# Patient Record
Sex: FEMALE | Race: WHITE | NOT HISPANIC OR LATINO | URBAN - METROPOLITAN AREA
[De-identification: names, ages, dates, MRNs, and addresses within clinical notes are randomized per-mention and may not be internally consistent; named-entity substitution may affect disease eponyms.]

---

## 2017-10-04 ENCOUNTER — OUTPATIENT (OUTPATIENT)
Dept: OUTPATIENT SERVICES | Facility: HOSPITAL | Age: 62
LOS: 1 days | End: 2017-10-04

## 2017-10-04 VITALS
SYSTOLIC BLOOD PRESSURE: 120 MMHG | TEMPERATURE: 98 F | HEART RATE: 82 BPM | WEIGHT: 126.1 LBS | HEIGHT: 61 IN | RESPIRATION RATE: 14 BRPM | DIASTOLIC BLOOD PRESSURE: 82 MMHG

## 2017-10-04 DIAGNOSIS — Z98.890 OTHER SPECIFIED POSTPROCEDURAL STATES: Chronic | ICD-10-CM

## 2017-10-04 DIAGNOSIS — Z40.01 ENCOUNTER FOR PROPHYLACTIC REMOVAL OF BREAST: ICD-10-CM

## 2017-10-04 DIAGNOSIS — Z98.89 OTHER SPECIFIED POSTPROCEDURAL STATES: Chronic | ICD-10-CM

## 2017-10-04 DIAGNOSIS — N62 HYPERTROPHY OF BREAST: ICD-10-CM

## 2017-10-04 LAB
BUN SERPL-MCNC: 18 MG/DL — SIGNIFICANT CHANGE UP (ref 7–23)
CALCIUM SERPL-MCNC: 9.5 MG/DL — SIGNIFICANT CHANGE UP (ref 8.4–10.5)
CHLORIDE SERPL-SCNC: 100 MMOL/L — SIGNIFICANT CHANGE UP (ref 98–107)
CO2 SERPL-SCNC: 25 MMOL/L — SIGNIFICANT CHANGE UP (ref 22–31)
CREAT SERPL-MCNC: 0.85 MG/DL — SIGNIFICANT CHANGE UP (ref 0.5–1.3)
GLUCOSE SERPL-MCNC: 82 MG/DL — SIGNIFICANT CHANGE UP (ref 70–99)
HCT VFR BLD CALC: 41 % — SIGNIFICANT CHANGE UP (ref 34.5–45)
HGB BLD-MCNC: 13.6 G/DL — SIGNIFICANT CHANGE UP (ref 11.5–15.5)
MCHC RBC-ENTMCNC: 29.3 PG — SIGNIFICANT CHANGE UP (ref 27–34)
MCHC RBC-ENTMCNC: 33.2 % — SIGNIFICANT CHANGE UP (ref 32–36)
MCV RBC AUTO: 88.4 FL — SIGNIFICANT CHANGE UP (ref 80–100)
NRBC # FLD: 0 — SIGNIFICANT CHANGE UP
PLATELET # BLD AUTO: 271 K/UL — SIGNIFICANT CHANGE UP (ref 150–400)
PMV BLD: 11.1 FL — SIGNIFICANT CHANGE UP (ref 7–13)
POTASSIUM SERPL-MCNC: 3.9 MMOL/L — SIGNIFICANT CHANGE UP (ref 3.5–5.3)
POTASSIUM SERPL-SCNC: 3.9 MMOL/L — SIGNIFICANT CHANGE UP (ref 3.5–5.3)
RBC # BLD: 4.64 M/UL — SIGNIFICANT CHANGE UP (ref 3.8–5.2)
RBC # FLD: 12 % — SIGNIFICANT CHANGE UP (ref 10.3–14.5)
SODIUM SERPL-SCNC: 139 MMOL/L — SIGNIFICANT CHANGE UP (ref 135–145)
WBC # BLD: 8.21 K/UL — SIGNIFICANT CHANGE UP (ref 3.8–10.5)
WBC # FLD AUTO: 8.21 K/UL — SIGNIFICANT CHANGE UP (ref 3.8–10.5)

## 2017-10-04 NOTE — H&P PST ADULT - SKIN/BREAST COMMENTS
neck pain, shoulder pain, shoulder grooves from bra straps x several years, symptoms have been worsening.  Pt scheduled for Bilateral breast reduction 10/17/17. Pt c/o neck pain, shoulder pain, shoulder grooves from bra straps, x several years, symptoms have been worsening.  Pt scheduled for Bilateral breast reduction 10/17/17. c/o neck pain, shoulder pain, shoulder grooves from bra straps, x several years, symptoms have been worsening.  Pt scheduled for Bilateral breast reduction 10/17/17.

## 2017-10-04 NOTE — H&P PST ADULT - PMH
Anxiety    GERD (gastroesophageal reflux disease)    Patella fracture  Bilateral 2010  Urine finding  Pt with increased calcium in urine, (treated with  on hydrochlorthiazide)  Varicose veins

## 2017-10-04 NOTE — H&P PST ADULT - NEGATIVE BREAST SYMPTOMS
no breast lump R/no nipple discharge R/no breast lump L/no nipple discharge L/no breast tenderness L/no breast tenderness R

## 2017-10-04 NOTE — H&P PST ADULT - NSANTHOSAYNRD_GEN_A_CORE
No. KEVIN screening performed.  STOP BANG Legend: 0-2 = LOW Risk; 3-4 = INTERMEDIATE Risk; 5-8 = HIGH Risk

## 2017-10-04 NOTE — H&P PST ADULT - PSY GEN HX ROS MEA POS PC
takes  Cymbalta  with good symptom control/anxiety hx of panic attacks, takes  Cymbalta.  Pt reports she has not had panic attack since she has been taking Cymbalta/anxiety

## 2017-10-04 NOTE — H&P PST ADULT - HISTORY OF PRESENT ILLNESS
62 y/io female with hx of neck pain, shoulder pain, shoulder grooves from bra straps x several years, symptoms have been worsening.  Pt scheduled for Bilateral breast reduction 10/17/17.

## 2017-10-04 NOTE — H&P PST ADULT - PSH
delivery NOS  1984 1987   H/O arthroscopy of left knee  Torn Miniscus - Repair  left Knee Meniscus - 2014  H/O varicose veins  Vein stripping   History of blepharoplasty  Bilateral Lids -   Patella fracture  Multiple surgeries for bilateral patella fracture; most recently 2013.  S/P abdominoplasty    S/P endometrial ablation    Tendon disorder  Repair of right elbow tendon  delivery NOS  1984 1987   H/O arthroscopy of left knee  Torn Miniscus - Repair  left Knee Meniscus - 2014  H/O blepharoplasty    H/O hand surgery    H/O varicose veins  Vein stripping   History of blepharoplasty  Bilateral Lids - 2013  Patella fracture  Multiple surgeries for bilateral patella fracture; most recently 2013.  S/P abdominoplasty    S/P endometrial ablation    Tendon disorder  Repair of right elbow tendon

## 2017-10-04 NOTE — H&P PST ADULT - NEGATIVE NEUROLOGICAL SYMPTOMS
no transient paralysis/no tremors/no paresthesias/no difficulty walking/no syncope/no vertigo/no weakness/no headache

## 2017-10-16 RX ORDER — SODIUM CHLORIDE 9 MG/ML
1000 INJECTION, SOLUTION INTRAVENOUS
Qty: 0 | Refills: 0 | Status: DISCONTINUED | OUTPATIENT
Start: 2017-10-17 | End: 2017-11-01

## 2017-10-17 ENCOUNTER — RESULT REVIEW (OUTPATIENT)
Age: 62
End: 2017-10-17

## 2017-10-17 ENCOUNTER — OUTPATIENT (OUTPATIENT)
Dept: OUTPATIENT SERVICES | Facility: HOSPITAL | Age: 62
LOS: 1 days | Discharge: HOME | End: 2017-10-17
Payer: COMMERCIAL

## 2017-10-17 VITALS
RESPIRATION RATE: 16 BRPM | SYSTOLIC BLOOD PRESSURE: 99 MMHG | OXYGEN SATURATION: 96 % | HEART RATE: 84 BPM | DIASTOLIC BLOOD PRESSURE: 52 MMHG | TEMPERATURE: 98 F

## 2017-10-17 VITALS
WEIGHT: 126.1 LBS | SYSTOLIC BLOOD PRESSURE: 119 MMHG | DIASTOLIC BLOOD PRESSURE: 78 MMHG | TEMPERATURE: 98 F | OXYGEN SATURATION: 96 % | RESPIRATION RATE: 16 BRPM | HEART RATE: 75 BPM | HEIGHT: 61 IN

## 2017-10-17 DIAGNOSIS — Z98.89 OTHER SPECIFIED POSTPROCEDURAL STATES: Chronic | ICD-10-CM

## 2017-10-17 DIAGNOSIS — Z40.01 ENCOUNTER FOR PROPHYLACTIC REMOVAL OF BREAST: ICD-10-CM

## 2017-10-17 DIAGNOSIS — Z98.890 OTHER SPECIFIED POSTPROCEDURAL STATES: Chronic | ICD-10-CM

## 2017-10-17 PROCEDURE — 88305 TISSUE EXAM BY PATHOLOGIST: CPT | Mod: 26

## 2017-10-17 PROCEDURE — 19318 BREAST REDUCTION: CPT | Mod: 50

## 2017-10-17 RX ORDER — OXYCODONE HYDROCHLORIDE 5 MG/1
5 TABLET ORAL ONCE
Qty: 0 | Refills: 0 | Status: DISCONTINUED | OUTPATIENT
Start: 2017-10-17 | End: 2017-10-17

## 2017-10-17 RX ORDER — FENTANYL CITRATE 50 UG/ML
25 INJECTION INTRAVENOUS
Qty: 0 | Refills: 0 | Status: DISCONTINUED | OUTPATIENT
Start: 2017-10-17 | End: 2017-10-17

## 2017-10-17 RX ORDER — ALUMINUM ZIRCONIUM TRICHLOROHYDREX GLY 0.2 G/G
1 STICK TOPICAL
Qty: 0 | Refills: 0 | COMMUNITY

## 2017-10-17 RX ORDER — PREGABALIN 225 MG/1
1 CAPSULE ORAL
Qty: 0 | Refills: 0 | COMMUNITY

## 2017-10-17 RX ORDER — DULOXETINE HYDROCHLORIDE 30 MG/1
1 CAPSULE, DELAYED RELEASE ORAL
Qty: 0 | Refills: 0 | COMMUNITY

## 2017-10-17 RX ORDER — SODIUM CHLORIDE 9 MG/ML
1000 INJECTION, SOLUTION INTRAVENOUS
Qty: 0 | Refills: 0 | Status: DISCONTINUED | OUTPATIENT
Start: 2017-10-17 | End: 2017-11-01

## 2017-10-17 RX ORDER — PANTOPRAZOLE SODIUM 20 MG/1
1 TABLET, DELAYED RELEASE ORAL
Qty: 0 | Refills: 0 | COMMUNITY

## 2017-10-17 RX ORDER — ONDANSETRON 8 MG/1
4 TABLET, FILM COATED ORAL ONCE
Qty: 0 | Refills: 0 | Status: COMPLETED | OUTPATIENT
Start: 2017-10-17 | End: 2017-10-17

## 2017-10-17 RX ORDER — CHOLECALCIFEROL (VITAMIN D3) 125 MCG
0 CAPSULE ORAL
Qty: 0 | Refills: 0 | COMMUNITY

## 2017-10-17 RX ADMIN — OXYCODONE HYDROCHLORIDE 5 MILLIGRAM(S): 5 TABLET ORAL at 16:30

## 2017-10-17 RX ADMIN — OXYCODONE HYDROCHLORIDE 5 MILLIGRAM(S): 5 TABLET ORAL at 17:00

## 2017-10-17 RX ADMIN — ONDANSETRON 4 MILLIGRAM(S): 8 TABLET, FILM COATED ORAL at 16:30

## 2017-10-17 NOTE — ASU DISCHARGE PLAN (ADULT/PEDIATRIC). - NOTIFY
Increased Irritability or Sluggishness/Numbness, tingling/Bleeding that does not stop/Unable to Urinate/Inability to Tolerate Liquids or Foods/Swelling that continues/Pain not relieved by Medications/Persistent Nausea and Vomiting/Fever greater than 101/Numbness, color, or temperature change to extremity/Excessive Diarrhea

## 2017-10-17 NOTE — ASU PATIENT PROFILE, ADULT - PSH
delivery NOS  1984 1987   H/O arthroscopy of left knee  Torn Miniscus - Repair  left Knee Meniscus - 2014  H/O blepharoplasty    H/O hand surgery    H/O varicose veins  Vein stripping   History of blepharoplasty  Bilateral Lids - 2013  Patella fracture  Multiple surgeries for bilateral patella fracture; most recently 2013.  S/P abdominoplasty    S/P endometrial ablation    Tendon disorder  Repair of right elbow tendon

## 2017-10-17 NOTE — ASU DISCHARGE PLAN (ADULT/PEDIATRIC). - MEDICATION SUMMARY - MEDICATIONS TO TAKE
I will START or STAY ON the medications listed below when I get home from the hospital:    Norco 5 mg-325 mg oral tablet  -- 1 tab(s) by mouth every 6 hours, As Needed - for severe pain  -- Indication: For Pain    Cymbalta 60 mg oral delayed release capsule  -- 1 cap(s) by mouth once a day am  -- Indication: For Home medication    hydroCHLOROthiazide 12.5 mg oral capsule  -- 1 cap(s) by mouth once a day am  -- Indication: For Home medication    clindamycin 300 mg oral capsule  -- 1 cap(s) by mouth every 6 hours  -- Indication: For Anti-infective    Align 4 mg oral capsule  -- 1 cap(s) by mouth once a day am  -- Indication: For Home medication    Protonix 40 mg oral delayed release tablet  -- 1 tab(s) by mouth once a day am  -- Indication: For Home medication    Vitamin D3 1000 intl units oral capsule  -- 3000 iu oral daily am  -- Indication: For Home medication    Vitamin B12 1000 mcg oral tablet  -- 1 tab(s) by mouth once a day am  -- Indication: For Home medication

## 2017-10-17 NOTE — BRIEF OPERATIVE NOTE - PROCEDURE
<<-----Click on this checkbox to enter Procedure Breast reduction and mastopexy  10/17/2017    Active  [id number]

## 2017-10-17 NOTE — ASU DISCHARGE PLAN (ADULT/PEDIATRIC). - ITEMS TO FOLLOWUP WITH YOUR PHYSICIAN'S
Please follow up with Dr. Chong within x1 week after discharge from the hospital. You may call [phone] to schedule an appointment.

## 2017-10-17 NOTE — ASU DISCHARGE PLAN (ADULT/PEDIATRIC). - NURSING INSTRUCTIONS
Do not take pain medication on an empty stomach.  Increase fluids and fiber in diet to prevent constipation. do not take tyleno or acetominophen before 8:30 pm tonight

## 2017-10-17 NOTE — ASU DISCHARGE PLAN (ADULT/PEDIATRIC). - SPECIAL INSTRUCTIONS
Resume normal diet. Avoid straining, exercise, or heavy lifting.    Take medications as instructed by prescriptions.    Keep surgical bra on until cleared.    Keep your head elevated as much as possible throughout the day and night until cleared.     Keep dressings clean, dry, and intact. Do not remove them until you're seen by Dr. Chong.    Please sponge bathe only until your first follow-up appointment.    Do not remove steri strips. They will fall off on their own.  After showering, please pat steri strips dry. After surgery, some blood may escape from under the tape. It is of no consequence. The paper tape may fall off after several days. If not, I will remove it in my office.    Do not raise arms above head.    Follow-up with primary care doctor as well.     Call 911 and return to the ED for chest pain, shortness of breath, significant increase in pain, or significant change in color of surgical sites.

## 2017-10-23 LAB — SURGICAL PATHOLOGY STUDY: SIGNIFICANT CHANGE UP

## 2018-02-02 ENCOUNTER — APPOINTMENT (OUTPATIENT)
Dept: DERMATOLOGY | Facility: CLINIC | Age: 63
End: 2018-02-02
Payer: COMMERCIAL

## 2018-02-02 VITALS
SYSTOLIC BLOOD PRESSURE: 118 MMHG | DIASTOLIC BLOOD PRESSURE: 72 MMHG | WEIGHT: 122 LBS | HEIGHT: 61 IN | BODY MASS INDEX: 23.03 KG/M2

## 2018-02-02 DIAGNOSIS — D48.9 NEOPLASM OF UNCERTAIN BEHAVIOR, UNSPECIFIED: ICD-10-CM

## 2018-02-02 DIAGNOSIS — Z80.8 FAMILY HISTORY OF MALIGNANT NEOPLASM OF OTHER ORGANS OR SYSTEMS: ICD-10-CM

## 2018-02-02 DIAGNOSIS — Z87.39 PERSONAL HISTORY OF OTHER DISEASES OF THE MUSCULOSKELETAL SYSTEM AND CONNECTIVE TISSUE: ICD-10-CM

## 2018-02-02 DIAGNOSIS — L81.4 OTHER MELANIN HYPERPIGMENTATION: ICD-10-CM

## 2018-02-02 DIAGNOSIS — Z12.83 ENCOUNTER FOR SCREENING FOR MALIGNANT NEOPLASM OF SKIN: ICD-10-CM

## 2018-02-02 DIAGNOSIS — L91.8 OTHER HYPERTROPHIC DISORDERS OF THE SKIN: ICD-10-CM

## 2018-02-02 PROCEDURE — 99203 OFFICE O/P NEW LOW 30 MIN: CPT

## 2018-02-02 PROCEDURE — D0051: CPT

## 2018-02-02 RX ORDER — PROMETHAZINE HYDROCHLORIDE AND CODEINE PHOSPHATE 6.25; 1 MG/5ML; MG/5ML
6.25-1 SOLUTION ORAL
Qty: 120 | Refills: 0 | Status: ACTIVE | COMMUNITY
Start: 2017-12-20

## 2018-02-02 RX ORDER — VALACYCLOVIR 500 MG/1
500 TABLET, FILM COATED ORAL
Qty: 14 | Refills: 0 | Status: ACTIVE | COMMUNITY
Start: 2017-10-09

## 2018-02-02 RX ORDER — HYDROCODONE BITARTRATE AND ACETAMINOPHEN 5; 325 MG/1; MG/1
5-325 TABLET ORAL
Qty: 30 | Refills: 0 | Status: ACTIVE | COMMUNITY
Start: 2017-10-02

## 2018-02-02 RX ORDER — DULOXETINE HYDROCHLORIDE 60 MG/1
60 CAPSULE, DELAYED RELEASE PELLETS ORAL
Qty: 90 | Refills: 0 | Status: ACTIVE | COMMUNITY
Start: 2017-09-12

## 2018-02-02 RX ORDER — MUPIROCIN 20 MG/G
2 OINTMENT TOPICAL
Qty: 22 | Refills: 0 | Status: ACTIVE | COMMUNITY
Start: 2017-10-30

## 2018-02-02 RX ORDER — CLINDAMYCIN HYDROCHLORIDE 150 MG/1
150 CAPSULE ORAL
Qty: 40 | Refills: 0 | Status: ACTIVE | COMMUNITY
Start: 2017-10-02

## 2018-02-02 RX ORDER — SULFAMETHOXAZOLE AND TRIMETHOPRIM 800; 160 MG/1; MG/1
800-160 TABLET ORAL
Qty: 20 | Refills: 0 | Status: ACTIVE | COMMUNITY
Start: 2017-11-06

## 2018-02-02 RX ORDER — CEFADROXIL 500 MG/1
500 CAPSULE ORAL
Qty: 20 | Refills: 0 | Status: ACTIVE | COMMUNITY
Start: 2017-10-30

## 2018-07-25 PROBLEM — Z80.8 FAMILY HISTORY OF SKIN CANCER: Status: INACTIVE | Noted: 2018-02-02

## 2019-04-17 ENCOUNTER — APPOINTMENT (OUTPATIENT)
Dept: ORTHOPEDIC SURGERY | Facility: CLINIC | Age: 64
End: 2019-04-17

## 2019-05-15 ENCOUNTER — APPOINTMENT (OUTPATIENT)
Dept: ORTHOPEDIC SURGERY | Facility: CLINIC | Age: 64
End: 2019-05-15
Payer: COMMERCIAL

## 2019-05-15 PROCEDURE — 99203 OFFICE O/P NEW LOW 30 MIN: CPT

## 2019-06-21 ENCOUNTER — RX RENEWAL (OUTPATIENT)
Age: 64
End: 2019-06-21

## 2019-06-21 RX ORDER — CIPROFLOXACIN HYDROCHLORIDE 250 MG/1
250 TABLET, FILM COATED ORAL
Qty: 14 | Refills: 0 | Status: ACTIVE | COMMUNITY
Start: 2019-06-21 | End: 1900-01-01

## 2019-09-18 ENCOUNTER — RX RENEWAL (OUTPATIENT)
Age: 64
End: 2019-09-18

## 2019-09-18 DIAGNOSIS — J45.40 MODERATE PERSISTENT ASTHMA, UNCOMPLICATED: ICD-10-CM

## 2019-09-18 RX ORDER — FLUTICASONE FUROATE AND VILANTEROL TRIFENATATE 100; 25 UG/1; UG/1
100-25 POWDER RESPIRATORY (INHALATION) DAILY
Qty: 2 | Refills: 11 | Status: ACTIVE | COMMUNITY
Start: 2019-09-18 | End: 1900-01-01

## 2019-10-16 ENCOUNTER — RECORD ABSTRACTING (OUTPATIENT)
Age: 64
End: 2019-10-16

## 2019-10-16 DIAGNOSIS — Z86.79 PERSONAL HISTORY OF OTHER DISEASES OF THE CIRCULATORY SYSTEM: ICD-10-CM

## 2019-10-16 DIAGNOSIS — Z86.010 PERSONAL HISTORY OF COLONIC POLYPS: ICD-10-CM

## 2019-10-16 DIAGNOSIS — K64.8 OTHER HEMORRHOIDS: ICD-10-CM

## 2019-10-16 DIAGNOSIS — Z86.39 PERSONAL HISTORY OF OTHER ENDOCRINE, NUTRITIONAL AND METABOLIC DISEASE: ICD-10-CM

## 2019-10-16 DIAGNOSIS — Z87.19 PERSONAL HISTORY OF OTHER DISEASES OF THE DIGESTIVE SYSTEM: ICD-10-CM

## 2019-10-16 DIAGNOSIS — K29.80 DUODENITIS W/OUT BLEEDING: ICD-10-CM

## 2019-10-21 ENCOUNTER — RX RENEWAL (OUTPATIENT)
Age: 64
End: 2019-10-21

## 2019-10-31 ENCOUNTER — OTHER (OUTPATIENT)
Age: 64
End: 2019-10-31

## 2019-10-31 ENCOUNTER — APPOINTMENT (OUTPATIENT)
Dept: INTERNAL MEDICINE | Facility: CLINIC | Age: 64
End: 2019-10-31
Payer: COMMERCIAL

## 2019-10-31 ENCOUNTER — MED ADMIN CHARGE (OUTPATIENT)
Age: 64
End: 2019-10-31

## 2019-10-31 PROCEDURE — 90471 IMMUNIZATION ADMIN: CPT

## 2019-10-31 PROCEDURE — 90732 PPSV23 VACC 2 YRS+ SUBQ/IM: CPT

## 2019-10-31 PROCEDURE — 90686 IIV4 VACC NO PRSV 0.5 ML IM: CPT

## 2019-10-31 PROCEDURE — 90472 IMMUNIZATION ADMIN EACH ADD: CPT

## 2020-04-27 DIAGNOSIS — U07.1 COVID-19: ICD-10-CM

## 2020-04-27 NOTE — HISTORY OF PRESENT ILLNESS
[Verbal consent obtained from patient] : the patient, [unfilled] [FreeTextEntry1] : Patient is a 61 y/o F with hx of asthma, bronchitis, chronic GERD who recently tested positive for COVID-19 on 03/25/2020. Patient states she tested positive at an outpatient testing facility and never required hospitalization. States symptoms resolved at home with symptomatic management. Denies fevers, chills, SOB, cough, lethargy/fatigue at this time.

## 2020-04-27 NOTE — ADDENDUM
[FreeTextEntry1] : reviewed with resident via telephone due to covid19 agree with residents evaluation and plan [email or web address]

## 2020-04-27 NOTE — PLAN
[FreeTextEntry1] : Will forward to [address] for further followup \par \par \par Case discussed with Dr. Marie. \par \par Marta Lay\par PGY-1 \par [employer].

## 2020-09-23 ENCOUNTER — APPOINTMENT (OUTPATIENT)
Dept: OBGYN | Facility: CLINIC | Age: 65
End: 2020-09-23
Payer: COMMERCIAL

## 2020-09-23 PROCEDURE — 99386 PREV VISIT NEW AGE 40-64: CPT

## 2020-11-02 ENCOUNTER — NON-APPOINTMENT (OUTPATIENT)
Age: 65
End: 2020-11-02

## 2020-11-02 ENCOUNTER — APPOINTMENT (OUTPATIENT)
Dept: INTERNAL MEDICINE | Facility: CLINIC | Age: 65
End: 2020-11-02
Payer: COMMERCIAL

## 2020-11-02 VITALS
HEART RATE: 83 BPM | BODY MASS INDEX: 21.52 KG/M2 | TEMPERATURE: 98.3 F | OXYGEN SATURATION: 97 % | WEIGHT: 114 LBS | HEIGHT: 61 IN

## 2020-11-02 VITALS — DIASTOLIC BLOOD PRESSURE: 70 MMHG | SYSTOLIC BLOOD PRESSURE: 100 MMHG

## 2020-11-02 DIAGNOSIS — R60.9 EDEMA, UNSPECIFIED: ICD-10-CM

## 2020-11-02 DIAGNOSIS — Z23 ENCOUNTER FOR IMMUNIZATION: ICD-10-CM

## 2020-11-02 PROCEDURE — 90662 IIV NO PRSV INCREASED AG IM: CPT

## 2020-11-02 PROCEDURE — 99072 ADDL SUPL MATRL&STAF TM PHE: CPT

## 2020-11-02 PROCEDURE — 36415 COLL VENOUS BLD VENIPUNCTURE: CPT

## 2020-11-02 PROCEDURE — 82272 OCCULT BLD FECES 1-3 TESTS: CPT

## 2020-11-02 PROCEDURE — 93000 ELECTROCARDIOGRAM COMPLETE: CPT

## 2020-11-02 PROCEDURE — G0008: CPT

## 2020-11-02 PROCEDURE — 99396 PREV VISIT EST AGE 40-64: CPT | Mod: 25

## 2020-11-02 RX ORDER — ZOSTER VACCINE RECOMBINANT, ADJUVANTED 50 MCG/0.5
50 KIT INTRAMUSCULAR
Qty: 1 | Refills: 1 | Status: ACTIVE | COMMUNITY
Start: 2020-11-02 | End: 1900-01-01

## 2020-11-02 NOTE — REVIEW OF SYSTEMS
[Negative] : Heme/Lymph [FreeTextEntry2] : 10 lb wt loss w dieting [FreeTextEntry3] : itch L canal [FreeTextEntry7] : see hpi [de-identified] : gets cervical blocks for H/A

## 2020-11-02 NOTE — HISTORY OF PRESENT ILLNESS
[de-identified] : 65 F for cpe. Pt had Covid 19 this March w full recovery other than Change in olfactory sense. Has mild ashtma, Gerd and anxiety on cymbalta. At the present time is physically active w/o cp sympt. GI to have endoscopy and colonoscopy and has occas chest pain from esoph spasm. No gu opr gyne sympt. Given flu shot

## 2020-11-02 NOTE — ASSESSMENT
[FreeTextEntry1] : given flu. All screening tests done. To have colonoscopy and endoscopy to [facility]. EKG normal

## 2020-11-02 NOTE — PHYSICAL EXAM
[Normal] : normal gait, coordination grossly intact, no focal deficits [de-identified] : s/peast reductions fat nexcrosis R 6 o'clock [FreeTextEntry1] : refused [de-identified] : neg [de-identified] : neg [de-identified] : neg

## 2020-11-03 LAB
25(OH)D3 SERPL-MCNC: 57 NG/ML
ALBUMIN SERPL ELPH-MCNC: 4.4 G/DL
ALP BLD-CCNC: 51 U/L
ALT SERPL-CCNC: 67 U/L
ANION GAP SERPL CALC-SCNC: 11 MMOL/L
APPEARANCE: CLEAR
AST SERPL-CCNC: 37 U/L
BACTERIA: NEGATIVE
BASOPHILS # BLD AUTO: 0.05 K/UL
BASOPHILS NFR BLD AUTO: 0.9 %
BILIRUB SERPL-MCNC: 0.4 MG/DL
BILIRUBIN URINE: NEGATIVE
BLOOD URINE: NEGATIVE
BUN SERPL-MCNC: 18 MG/DL
CALCIUM SERPL-MCNC: 9.6 MG/DL
CHLORIDE SERPL-SCNC: 100 MMOL/L
CHOLEST SERPL-MCNC: 231 MG/DL
CO2 SERPL-SCNC: 26 MMOL/L
COLOR: NORMAL
CREAT SERPL-MCNC: 0.66 MG/DL
EOSINOPHIL # BLD AUTO: 0.26 K/UL
EOSINOPHIL NFR BLD AUTO: 4.6 %
GLUCOSE QUALITATIVE U: NEGATIVE
GLUCOSE SERPL-MCNC: 97 MG/DL
HCT VFR BLD CALC: 40.7 %
HDLC SERPL-MCNC: 105 MG/DL
HGB BLD-MCNC: 13.5 G/DL
HYALINE CASTS: 0 /LPF
IMM GRANULOCYTES NFR BLD AUTO: 0.4 %
KETONES URINE: NEGATIVE
LDLC SERPL CALC-MCNC: 115 MG/DL
LEUKOCYTE ESTERASE URINE: NEGATIVE
LYMPHOCYTES # BLD AUTO: 2.57 K/UL
LYMPHOCYTES NFR BLD AUTO: 45.4 %
MAGNESIUM SERPL-MCNC: 2 MG/DL
MAN DIFF?: NORMAL
MCHC RBC-ENTMCNC: 29.6 PG
MCHC RBC-ENTMCNC: 33.2 GM/DL
MCV RBC AUTO: 89.3 FL
MICROSCOPIC-UA: NORMAL
MONOCYTES # BLD AUTO: 0.44 K/UL
MONOCYTES NFR BLD AUTO: 7.8 %
NEUTROPHILS # BLD AUTO: 2.32 K/UL
NEUTROPHILS NFR BLD AUTO: 40.9 %
NITRITE URINE: NEGATIVE
NONHDLC SERPL-MCNC: 126 MG/DL
PH URINE: 7.5
PLATELET # BLD AUTO: 289 K/UL
POTASSIUM SERPL-SCNC: 4.9 MMOL/L
PROT SERPL-MCNC: 7 G/DL
PROTEIN URINE: NEGATIVE
RBC # BLD: 4.56 M/UL
RBC # FLD: 13.1 %
RED BLOOD CELLS URINE: 1 /HPF
SODIUM SERPL-SCNC: 137 MMOL/L
SPECIFIC GRAVITY URINE: 1.02
SQUAMOUS EPITHELIAL CELLS: 1 /HPF
TRIGL SERPL-MCNC: 56 MG/DL
UROBILINOGEN URINE: NORMAL
WBC # FLD AUTO: 5.66 K/UL
WHITE BLOOD CELLS URINE: 1 /HPF

## 2020-11-05 ENCOUNTER — APPOINTMENT (OUTPATIENT)
Dept: INTERNAL MEDICINE | Facility: CLINIC | Age: 65
End: 2020-11-05
Payer: COMMERCIAL

## 2020-11-05 PROCEDURE — 99072 ADDL SUPL MATRL&STAF TM PHE: CPT

## 2020-11-05 PROCEDURE — 90750 HZV VACC RECOMBINANT IM: CPT

## 2020-11-05 PROCEDURE — 90471 IMMUNIZATION ADMIN: CPT

## 2020-12-11 DIAGNOSIS — Z78.9 OTHER SPECIFIED HEALTH STATUS: ICD-10-CM

## 2020-12-11 DIAGNOSIS — Z87.891 PERSONAL HISTORY OF NICOTINE DEPENDENCE: ICD-10-CM

## 2020-12-11 DIAGNOSIS — Z87.81 PERSONAL HISTORY OF (HEALED) TRAUMATIC FRACTURE: ICD-10-CM

## 2021-01-28 ENCOUNTER — NON-APPOINTMENT (OUTPATIENT)
Age: 66
End: 2021-01-28

## 2021-01-28 RX ORDER — FLUTICASONE FUROATE AND VILANTEROL TRIFENATATE 100; 25 UG/1; UG/1
100-25 POWDER RESPIRATORY (INHALATION)
Qty: 1 | Refills: 0 | Status: ACTIVE | COMMUNITY
Start: 2019-06-21 | End: 1900-01-01

## 2021-03-10 ENCOUNTER — APPOINTMENT (OUTPATIENT)
Dept: RADIOLOGY | Facility: IMAGING CENTER | Age: 66
End: 2021-03-10
Payer: COMMERCIAL

## 2021-03-10 ENCOUNTER — OUTPATIENT (OUTPATIENT)
Dept: OUTPATIENT SERVICES | Facility: HOSPITAL | Age: 66
LOS: 1 days | End: 2021-03-10
Payer: COMMERCIAL

## 2021-03-10 ENCOUNTER — APPOINTMENT (OUTPATIENT)
Dept: INTERNAL MEDICINE | Facility: CLINIC | Age: 66
End: 2021-03-10
Payer: COMMERCIAL

## 2021-03-10 VITALS — WEIGHT: 118 LBS | BODY MASS INDEX: 22.28 KG/M2 | HEIGHT: 61 IN | OXYGEN SATURATION: 96 % | HEART RATE: 97 BPM

## 2021-03-10 DIAGNOSIS — J40 BRONCHITIS, NOT SPECIFIED AS ACUTE OR CHRONIC: ICD-10-CM

## 2021-03-10 DIAGNOSIS — J45.909 UNSPECIFIED ASTHMA, UNCOMPLICATED: ICD-10-CM

## 2021-03-10 DIAGNOSIS — Z98.890 OTHER SPECIFIED POSTPROCEDURAL STATES: Chronic | ICD-10-CM

## 2021-03-10 DIAGNOSIS — R94.5 ABNORMAL RESULTS OF LIVER FUNCTION STUDIES: ICD-10-CM

## 2021-03-10 DIAGNOSIS — Z98.89 OTHER SPECIFIED POSTPROCEDURAL STATES: Chronic | ICD-10-CM

## 2021-03-10 DIAGNOSIS — K21.9 GASTRO-ESOPHAGEAL REFLUX DISEASE WITHOUT ESOPHAGITIS: ICD-10-CM

## 2021-03-10 PROCEDURE — 71046 X-RAY EXAM CHEST 2 VIEWS: CPT | Mod: 26

## 2021-03-10 PROCEDURE — 99214 OFFICE O/P EST MOD 30 MIN: CPT | Mod: 25

## 2021-03-10 PROCEDURE — 36415 COLL VENOUS BLD VENIPUNCTURE: CPT

## 2021-03-10 PROCEDURE — 99072 ADDL SUPL MATRL&STAF TM PHE: CPT

## 2021-03-10 PROCEDURE — 71046 X-RAY EXAM CHEST 2 VIEWS: CPT

## 2021-03-10 NOTE — ASSESSMENT
[FreeTextEntry1] : Patient had an ALT of 67 which was isolated and we are repeating her liver functions.  Her exam is negative.  She does drink alcohol.  She has a persistent cough which is gotten worse and she is bringing up white to yellow sputum and this is probably a combination of nasal drip and asthma.  In view of the fact that she had Covid 19 as well as a past smoking history of 20 pack years we are getting a chest x-ray.  At the present time we have doubled her Breo to 200/25 with a mouthwash afterwards once in the morning and increased her Ventolin every 4 to 6 hours and added Flonase spray.  Following the evaluation of her chest x-ray if she is not better we will make further recommendations

## 2021-03-10 NOTE — HISTORY OF PRESENT ILLNESS
[FreeTextEntry8] : Patient is a 65-year-old female has had a long standing cough on and off felt to be secondary to nasal drip, reflux for which she is on pantoprazole and mild asthma for which she is on long-acting Breo 100/25 and Ventolin which she is taking 2 times a day at the present time.  She had Covid in March of last year with loss of sense of taste or smell body aches and pains and low-grade fever for 1 day but without pulmonary symptoms.  She was a 1 pack a day smoker until approximately 30 years ago when she stopped.  At that time she had a total of approximately 20 pack years.  Approximately 1 month ago she noted cough which did not respond to Robitussin and increased Ventolin at the present time is productive and white to yellow but without blood and she denies shortness of breath fever hemoptysis but does note wheezing.  She has no other significant symptoms at this time and feels well

## 2021-03-10 NOTE — PHYSICAL EXAM
[No Acute Distress] : no acute distress [Well Nourished] : well nourished [Well Developed] : well developed [Well-Appearing] : well-appearing [Normal Sclera/Conjunctiva] : normal sclera/conjunctiva [PERRL] : pupils equal round and reactive to light [EOMI] : extraocular movements intact [Normal Outer Ear/Nose] : the outer ears and nose were normal in appearance [Normal Oropharynx] : the oropharynx was normal [No JVD] : no jugular venous distention [No Lymphadenopathy] : no lymphadenopathy [Supple] : supple [Thyroid Normal, No Nodules] : the thyroid was normal and there were no nodules present [No Respiratory Distress] : no respiratory distress  [No Accessory Muscle Use] : no accessory muscle use [Clear to Auscultation] : lungs were clear to auscultation bilaterally [Normal Rate] : normal rate  [Regular Rhythm] : with a regular rhythm [Normal S1, S2] : normal S1 and S2 [No Murmur] : no murmur heard [No Carotid Bruits] : no carotid bruits [No Abdominal Bruit] : a ~M bruit was not heard ~T in the abdomen [No Varicosities] : no varicosities [Pedal Pulses Present] : the pedal pulses are present [No Edema] : there was no peripheral edema [No Palpable Aorta] : no palpable aorta [No Extremity Clubbing/Cyanosis] : no extremity clubbing/cyanosis [Soft] : abdomen soft [Non Tender] : non-tender [Non-distended] : non-distended [No Masses] : no abdominal mass palpated [No HSM] : no HSM [Normal Bowel Sounds] : normal bowel sounds [Normal Posterior Cervical Nodes] : no posterior cervical lymphadenopathy [Normal Anterior Cervical Nodes] : no anterior cervical lymphadenopathy [No CVA Tenderness] : no CVA  tenderness [No Spinal Tenderness] : no spinal tenderness [No Joint Swelling] : no joint swelling [Grossly Normal Strength/Tone] : grossly normal strength/tone [No Rash] : no rash [Coordination Grossly Intact] : coordination grossly intact [No Focal Deficits] : no focal deficits [Normal Gait] : normal gait [Deep Tendon Reflexes (DTR)] : deep tendon reflexes were 2+ and symmetric [Normal Affect] : the affect was normal [Normal Insight/Judgement] : insight and judgment were intact [de-identified] : And inspiratory and expiratory rhonchi

## 2021-03-11 ENCOUNTER — TRANSCRIPTION ENCOUNTER (OUTPATIENT)
Age: 66
End: 2021-03-11

## 2021-03-11 LAB
ALBUMIN SERPL ELPH-MCNC: 4.5 G/DL
ALP BLD-CCNC: 49 U/L
ALT SERPL-CCNC: 27 U/L
ANION GAP SERPL CALC-SCNC: 9 MMOL/L
AST SERPL-CCNC: 23 U/L
BASOPHILS # BLD AUTO: 0.06 K/UL
BASOPHILS NFR BLD AUTO: 0.8 %
BILIRUB SERPL-MCNC: 0.6 MG/DL
BUN SERPL-MCNC: 15 MG/DL
CALCIUM SERPL-MCNC: 10 MG/DL
CHLORIDE SERPL-SCNC: 102 MMOL/L
CO2 SERPL-SCNC: 28 MMOL/L
CREAT SERPL-MCNC: 0.81 MG/DL
EOSINOPHIL # BLD AUTO: 0.37 K/UL
EOSINOPHIL NFR BLD AUTO: 4.8 %
GLUCOSE SERPL-MCNC: 98 MG/DL
HCT VFR BLD CALC: 44 %
HGB BLD-MCNC: 14.5 G/DL
IMM GRANULOCYTES NFR BLD AUTO: 0.1 %
LYMPHOCYTES # BLD AUTO: 3.64 K/UL
LYMPHOCYTES NFR BLD AUTO: 47.6 %
MAN DIFF?: NORMAL
MCHC RBC-ENTMCNC: 29.4 PG
MCHC RBC-ENTMCNC: 33 GM/DL
MCV RBC AUTO: 89.1 FL
MONOCYTES # BLD AUTO: 0.49 K/UL
MONOCYTES NFR BLD AUTO: 6.4 %
NEUTROPHILS # BLD AUTO: 3.08 K/UL
NEUTROPHILS NFR BLD AUTO: 40.3 %
PLATELET # BLD AUTO: 324 K/UL
POTASSIUM SERPL-SCNC: 3.9 MMOL/L
PROT SERPL-MCNC: 7.6 G/DL
RBC # BLD: 4.94 M/UL
RBC # FLD: 12.7 %
SODIUM SERPL-SCNC: 138 MMOL/L
WBC # FLD AUTO: 7.65 K/UL

## 2021-03-15 RX ORDER — AZITHROMYCIN 250 MG/1
250 TABLET, FILM COATED ORAL
Qty: 6 | Refills: 1 | Status: ACTIVE | COMMUNITY
Start: 2017-12-13 | End: 1900-01-01

## 2021-03-18 LAB — IGE AB SERPL QL: 56 NG/ML

## 2021-04-05 ENCOUNTER — RX RENEWAL (OUTPATIENT)
Age: 66
End: 2021-04-05

## 2021-04-05 RX ORDER — ALPRAZOLAM 0.5 MG/1
0.5 TABLET ORAL
Qty: 60 | Refills: 0 | Status: ACTIVE | COMMUNITY
Start: 2017-09-12

## 2021-04-05 RX ORDER — PANTOPRAZOLE 40 MG/1
40 TABLET, DELAYED RELEASE ORAL
Qty: 90 | Refills: 0 | Status: ACTIVE | COMMUNITY
Start: 2021-04-05 | End: 1900-01-01

## 2021-04-08 ENCOUNTER — APPOINTMENT (OUTPATIENT)
Dept: INTERNAL MEDICINE | Facility: CLINIC | Age: 66
End: 2021-04-08
Payer: COMMERCIAL

## 2021-04-08 VITALS
DIASTOLIC BLOOD PRESSURE: 80 MMHG | HEIGHT: 61 IN | TEMPERATURE: 98 F | BODY MASS INDEX: 22.28 KG/M2 | SYSTOLIC BLOOD PRESSURE: 120 MMHG | OXYGEN SATURATION: 97 % | WEIGHT: 118 LBS | HEART RATE: 97 BPM

## 2021-04-08 DIAGNOSIS — R59.1 GENERALIZED ENLARGED LYMPH NODES: ICD-10-CM

## 2021-04-08 PROCEDURE — 36415 COLL VENOUS BLD VENIPUNCTURE: CPT

## 2021-04-08 PROCEDURE — 99072 ADDL SUPL MATRL&STAF TM PHE: CPT

## 2021-04-08 PROCEDURE — 99213 OFFICE O/P EST LOW 20 MIN: CPT | Mod: 25

## 2021-04-08 NOTE — PHYSICAL EXAM
[No Acute Distress] : no acute distress [Well Nourished] : well nourished [Well Developed] : well developed [Well-Appearing] : well-appearing [Normal Sclera/Conjunctiva] : normal sclera/conjunctiva [PERRL] : pupils equal round and reactive to light [EOMI] : extraocular movements intact [Normal Outer Ear/Nose] : the outer ears and nose were normal in appearance [Normal Oropharynx] : the oropharynx was normal [No JVD] : no jugular venous distention [No Lymphadenopathy] : no lymphadenopathy [Supple] : supple [Thyroid Normal, No Nodules] : the thyroid was normal and there were no nodules present [No Respiratory Distress] : no respiratory distress  [No Accessory Muscle Use] : no accessory muscle use [Clear to Auscultation] : lungs were clear to auscultation bilaterally [Normal Rate] : normal rate  [Regular Rhythm] : with a regular rhythm [Normal S1, S2] : normal S1 and S2 [No Murmur] : no murmur heard [No Carotid Bruits] : no carotid bruits [No Abdominal Bruit] : a ~M bruit was not heard ~T in the abdomen [No Varicosities] : no varicosities [Pedal Pulses Present] : the pedal pulses are present [No Edema] : there was no peripheral edema [No Palpable Aorta] : no palpable aorta [No Extremity Clubbing/Cyanosis] : no extremity clubbing/cyanosis [Soft] : abdomen soft [Non Tender] : non-tender [Non-distended] : non-distended [No Masses] : no abdominal mass palpated [No HSM] : no HSM [Normal Bowel Sounds] : normal bowel sounds [Normal Posterior Cervical Nodes] : no posterior cervical lymphadenopathy [Normal Anterior Cervical Nodes] : no anterior cervical lymphadenopathy [No CVA Tenderness] : no CVA  tenderness [No Spinal Tenderness] : no spinal tenderness [No Joint Swelling] : no joint swelling [Grossly Normal Strength/Tone] : grossly normal strength/tone [No Rash] : no rash [Coordination Grossly Intact] : coordination grossly intact [No Focal Deficits] : no focal deficits [Normal Gait] : normal gait [Deep Tendon Reflexes (DTR)] : deep tendon reflexes were 2+ and symmetric [Normal Affect] : the affect was normal [Normal Insight/Judgement] : insight and judgment were intact [de-identified] : Patient has a tender swollen node behind the left ear without other findings [de-identified] : And has an enlarged tender lymph node behind the left ear but no other nodes are palpable

## 2021-04-08 NOTE — HISTORY OF PRESENT ILLNESS
[FreeTextEntry8] : Patient is a 65-year-old female who has been treated for mild asthma.  4 hours ago she noted heat over her left ear followed by severe pain and swelling behind the left ear.  She has no other focalizing symptoms and denies changes in her eyes throat ear pain dental problems fever night sweats other swollen nodes fatigue GI  or cardiopulmonary symptoms.  She did have hair dye approximately 4 to 5 days ago.  She has never had anything like this previously.  She has had 1 shingles vaccine

## 2021-04-08 NOTE — ASSESSMENT
[FreeTextEntry1] : Patient has localized swelling of one lymph node behind the left ear which appears to be related to allergy possibly from her hair dye.  We will follow her for other possible causes including infection of the scalp or shingles.  We are getting a CBC chemistry and sed rate.  When I speak to her with her results she will let me know if there are any other changes.  She will use Benadryl and Tylenol for now

## 2021-04-09 LAB
ALBUMIN SERPL ELPH-MCNC: 4.4 G/DL
ALP BLD-CCNC: 44 U/L
ALT SERPL-CCNC: 23 U/L
ANION GAP SERPL CALC-SCNC: 11 MMOL/L
AST SERPL-CCNC: 24 U/L
BASOPHILS # BLD AUTO: 0.06 K/UL
BASOPHILS NFR BLD AUTO: 1 %
BILIRUB SERPL-MCNC: 0.4 MG/DL
BUN SERPL-MCNC: 20 MG/DL
CALCIUM SERPL-MCNC: 10.3 MG/DL
CHLORIDE SERPL-SCNC: 103 MMOL/L
CO2 SERPL-SCNC: 25 MMOL/L
CREAT SERPL-MCNC: 0.68 MG/DL
EOSINOPHIL # BLD AUTO: 0.28 K/UL
EOSINOPHIL NFR BLD AUTO: 4.6 %
ERYTHROCYTE [SEDIMENTATION RATE] IN BLOOD BY WESTERGREN METHOD: 22 MM/HR
GLUCOSE SERPL-MCNC: 100 MG/DL
HCT VFR BLD CALC: 41.3 %
HGB BLD-MCNC: 14 G/DL
IMM GRANULOCYTES NFR BLD AUTO: 0.2 %
LYMPHOCYTES # BLD AUTO: 2.97 K/UL
LYMPHOCYTES NFR BLD AUTO: 48.6 %
MAN DIFF?: NORMAL
MCHC RBC-ENTMCNC: 29.8 PG
MCHC RBC-ENTMCNC: 33.9 GM/DL
MCV RBC AUTO: 87.9 FL
MONOCYTES # BLD AUTO: 0.42 K/UL
MONOCYTES NFR BLD AUTO: 6.9 %
NEUTROPHILS # BLD AUTO: 2.37 K/UL
NEUTROPHILS NFR BLD AUTO: 38.7 %
PLATELET # BLD AUTO: 302 K/UL
POTASSIUM SERPL-SCNC: 4.5 MMOL/L
PROT SERPL-MCNC: 7.4 G/DL
RBC # BLD: 4.7 M/UL
RBC # FLD: 13.2 %
SODIUM SERPL-SCNC: 139 MMOL/L
WBC # FLD AUTO: 6.11 K/UL

## 2021-05-05 ENCOUNTER — APPOINTMENT (OUTPATIENT)
Dept: INTERNAL MEDICINE | Facility: CLINIC | Age: 66
End: 2021-05-05
Payer: COMMERCIAL

## 2021-05-05 PROCEDURE — 90750 HZV VACC RECOMBINANT IM: CPT

## 2021-05-05 PROCEDURE — 99072 ADDL SUPL MATRL&STAF TM PHE: CPT

## 2021-05-05 PROCEDURE — 90471 IMMUNIZATION ADMIN: CPT

## 2021-05-28 RX ORDER — ALBUTEROL SULFATE 90 UG/1
108 (90 BASE) POWDER, METERED RESPIRATORY (INHALATION)
Qty: 1 | Refills: 3 | Status: ACTIVE | COMMUNITY
Start: 2018-01-22 | End: 1900-01-01

## 2021-09-20 ENCOUNTER — RX RENEWAL (OUTPATIENT)
Age: 66
End: 2021-09-20

## 2021-12-15 ENCOUNTER — RX RENEWAL (OUTPATIENT)
Age: 66
End: 2021-12-15

## 2021-12-17 ENCOUNTER — NON-APPOINTMENT (OUTPATIENT)
Age: 66
End: 2021-12-17

## 2021-12-23 ENCOUNTER — NON-APPOINTMENT (OUTPATIENT)
Age: 66
End: 2021-12-23

## 2021-12-23 ENCOUNTER — APPOINTMENT (OUTPATIENT)
Dept: INTERNAL MEDICINE | Facility: CLINIC | Age: 66
End: 2021-12-23
Payer: COMMERCIAL

## 2021-12-23 VITALS
BODY MASS INDEX: 23.03 KG/M2 | DIASTOLIC BLOOD PRESSURE: 80 MMHG | SYSTOLIC BLOOD PRESSURE: 110 MMHG | WEIGHT: 122 LBS | HEIGHT: 61 IN | OXYGEN SATURATION: 96 % | HEART RATE: 84 BPM | TEMPERATURE: 98.1 F

## 2021-12-23 DIAGNOSIS — F41.9 ANXIETY DISORDER, UNSPECIFIED: ICD-10-CM

## 2021-12-23 DIAGNOSIS — J45.909 UNSPECIFIED ASTHMA, UNCOMPLICATED: ICD-10-CM

## 2021-12-23 DIAGNOSIS — K21.9 GASTRO-ESOPHAGEAL REFLUX DISEASE W/OUT ESOPHAGITIS: ICD-10-CM

## 2021-12-23 PROCEDURE — 36415 COLL VENOUS BLD VENIPUNCTURE: CPT

## 2021-12-23 PROCEDURE — 99396 PREV VISIT EST AGE 40-64: CPT | Mod: 25

## 2021-12-23 RX ORDER — DULOXETINE HYDROCHLORIDE 60 MG/1
60 CAPSULE, DELAYED RELEASE ORAL
Qty: 90 | Refills: 3 | Status: ACTIVE | COMMUNITY
Start: 2021-12-23

## 2021-12-23 NOTE — PHYSICAL EXAM
[Normal Female:] : bladder was normal on palpation [Normal] : normal gait, coordination grossly intact, no focal deficits [de-identified] : chronic neck pain [de-identified] : fat necrosis at 7 oclock -R s/p breast reductions [FreeTextEntry1] : refused [de-identified] : neg [de-identified] : neg [de-identified] : bilat knee surgeries

## 2021-12-23 NOTE — ASSESSMENT
[FreeTextEntry1] : Doing well exc chronic mm-skeletal pain  -to start mobic trial. All screening test ordered -given mammog slip. Updated vax and colonoscop[y,gyne. EKG normal

## 2021-12-23 NOTE — HISTORY OF PRESENT ILLNESS
[de-identified] : 66 F w anxiety, asthma ,sp knee replacements and chronic pain neck and now seeing ortho for back - placed on mobic. Has otherwise been well and denies CP sympt. GI neg and tx'ed w pantoprazole for Gerd, updated on colonoscopy. ,gyne neg, given order for mommog. No focalizingneuro weaknes but told R sciatica and to start pilades. Vax completed

## 2021-12-24 LAB
25(OH)D3 SERPL-MCNC: 61.1 NG/ML
ALBUMIN SERPL ELPH-MCNC: 4.5 G/DL
ALP BLD-CCNC: 55 U/L
ALT SERPL-CCNC: 26 U/L
ANION GAP SERPL CALC-SCNC: 13 MMOL/L
APPEARANCE: CLEAR
AST SERPL-CCNC: 21 U/L
BACTERIA: NEGATIVE
BASOPHILS # BLD AUTO: 0.04 K/UL
BASOPHILS NFR BLD AUTO: 0.5 %
BILIRUB SERPL-MCNC: 0.5 MG/DL
BILIRUBIN URINE: NEGATIVE
BLOOD URINE: NEGATIVE
BUN SERPL-MCNC: 18 MG/DL
CALCIUM SERPL-MCNC: 9.8 MG/DL
CHLORIDE SERPL-SCNC: 102 MMOL/L
CHOLEST SERPL-MCNC: 268 MG/DL
CO2 SERPL-SCNC: 24 MMOL/L
COLOR: YELLOW
CREAT SERPL-MCNC: 0.75 MG/DL
CRP SERPL HS-MCNC: 1.15 MG/L
EOSINOPHIL # BLD AUTO: 0.11 K/UL
EOSINOPHIL NFR BLD AUTO: 1.4 %
GLUCOSE QUALITATIVE U: NEGATIVE
GLUCOSE SERPL-MCNC: 84 MG/DL
HCT VFR BLD CALC: 43.1 %
HDLC SERPL-MCNC: 94 MG/DL
HGB BLD-MCNC: 14.2 G/DL
HYALINE CASTS: 3 /LPF
IMM GRANULOCYTES NFR BLD AUTO: 0.3 %
KETONES URINE: NORMAL
LDLC SERPL CALC-MCNC: 146 MG/DL
LEUKOCYTE ESTERASE URINE: ABNORMAL
LYMPHOCYTES # BLD AUTO: 2.84 K/UL
LYMPHOCYTES NFR BLD AUTO: 36.6 %
MAN DIFF?: NORMAL
MCHC RBC-ENTMCNC: 30.3 PG
MCHC RBC-ENTMCNC: 32.9 GM/DL
MCV RBC AUTO: 91.9 FL
MICROSCOPIC-UA: NORMAL
MONOCYTES # BLD AUTO: 0.64 K/UL
MONOCYTES NFR BLD AUTO: 8.2 %
NEUTROPHILS # BLD AUTO: 4.12 K/UL
NEUTROPHILS NFR BLD AUTO: 53 %
NITRITE URINE: NEGATIVE
NONHDLC SERPL-MCNC: 174 MG/DL
PH URINE: 5.5
PLATELET # BLD AUTO: 288 K/UL
POTASSIUM SERPL-SCNC: 4.7 MMOL/L
PROT SERPL-MCNC: 7.4 G/DL
PROTEIN URINE: NORMAL
RBC # BLD: 4.69 M/UL
RBC # FLD: 13 %
RED BLOOD CELLS URINE: 2 /HPF
SODIUM SERPL-SCNC: 138 MMOL/L
SPECIFIC GRAVITY URINE: 1.02
SQUAMOUS EPITHELIAL CELLS: 1 /HPF
TRIGL SERPL-MCNC: 143 MG/DL
TSH SERPL-ACNC: 0.82 UIU/ML
UROBILINOGEN URINE: NORMAL
WBC # FLD AUTO: 7.77 K/UL
WHITE BLOOD CELLS URINE: 3 /HPF

## 2022-03-21 ENCOUNTER — RX RENEWAL (OUTPATIENT)
Age: 67
End: 2022-03-21

## 2022-06-22 ENCOUNTER — RX RENEWAL (OUTPATIENT)
Age: 67
End: 2022-06-22

## 2022-07-18 DIAGNOSIS — Z00.00 ENCOUNTER FOR GENERAL ADULT MEDICAL EXAMINATION W/OUT ABNORMAL FINDINGS: ICD-10-CM

## 2022-09-03 ENCOUNTER — RX RENEWAL (OUTPATIENT)
Age: 67
End: 2022-09-03

## 2022-10-14 ENCOUNTER — APPOINTMENT (OUTPATIENT)
Dept: BARIATRICS/WEIGHT MGMT | Facility: CLINIC | Age: 67
End: 2022-10-14

## 2022-10-14 PROCEDURE — 99205 OFFICE O/P NEW HI 60 MIN: CPT | Mod: 95

## 2022-10-14 RX ORDER — TOPIRAMATE 25 MG/1
25 TABLET, FILM COATED ORAL
Qty: 60 | Refills: 5 | Status: ACTIVE | COMMUNITY
Start: 2022-10-14 | End: 1900-01-01

## 2022-10-15 NOTE — HISTORY OF PRESENT ILLNESS
[FreeTextEntry1] : 67 years old woman with HLD, pre-dm and anxiety d/o presents with recent weight gain for initial eval.\par \par borderline glucose recently\par had allergic asthma reaction in May and was put on steroids for two weeks - gained about 14 lbs during that period - eating high sugar, frozen yogurt, etc.\par hx of panic attacks in the past.  take cymbalta which has helped.\par \par SOCIAL:\par lives with \par has  very active social life\par \par FOOD:\par skips breakfast - no appetite - 2 cups of coffee, equal + skim plus\par lunch - salad with beans, light balsamic vinagrette dressing\par dinner - goes out a lot - fish, vegetables, rarely meat - has a drink or two - goes out 4+ times per week\par sticking to almonds for snacks, rarely small piece of talk chocolate\par \par PHYSICAL ACTIVITY:\par was doing pilates twice per week - so looking for new \par walks but amount varies\par \par SLEEP:\par sleep is usually good - 8-9 hours\par \par STRESS:\par always stress \par \par Please refer to intake forms for complete weight and related history.\par

## 2022-10-15 NOTE — REASON FOR VISIT
[Home] : at home, [unfilled] , at the time of the visit. [Other Location: e.g. Home (Enter Location, City,State)___] : at [unfilled] [Other:____] : [unfilled] [Patient] : the patient [Initial Evaluation] : an initial evaluation [FreeTextEntry1] : abnormal weight gain

## 2022-10-15 NOTE — ASSESSMENT
[FreeTextEntry1] : BARIATRIC SURGERY HISTORY: \par \par OBESITY COMORBIDITIES: HLD, pre-dm\par \par ANTI-OBESITY MEDICATIONS:\par \par OBESITY MEDICATION SIDE EFFECTS:\par \par 66 yo woman with HLD, pre-dm, anxiety d/o on cymbalta and recent weight gain that she has not been able to control\par \par Recommend the following:\par will send metabolic labs recently drawn\par whole foods based eating strategy limiting refined carbs and added fats - recommend avoiding sat fat and WFPB + fish 2x/week\par etoh at most once per week\par keep food journal\par track daily activity- 10K steps/day and restart pilates\par will work with NP on intensive lfiestyle modification\par trial of low dose topiramate 25mg with up-titration to 50mg as tolerated\par \par rtc in 4 weeks.\par

## 2022-10-18 ENCOUNTER — NON-APPOINTMENT (OUTPATIENT)
Age: 67
End: 2022-10-18

## 2022-10-18 RX ORDER — SEMAGLUTIDE 1.34 MG/ML
2 INJECTION, SOLUTION SUBCUTANEOUS
Qty: 1 | Refills: 5 | Status: ACTIVE | COMMUNITY
Start: 2022-10-18 | End: 1900-01-01

## 2022-10-28 ENCOUNTER — APPOINTMENT (OUTPATIENT)
Dept: BARIATRICS/WEIGHT MGMT | Facility: CLINIC | Age: 67
End: 2022-10-28

## 2022-10-28 VITALS — WEIGHT: 130 LBS | BODY MASS INDEX: 24.55 KG/M2 | HEIGHT: 61 IN

## 2022-10-28 DIAGNOSIS — R63.5 ABNORMAL WEIGHT GAIN: ICD-10-CM

## 2022-10-28 PROCEDURE — 99213 OFFICE O/P EST LOW 20 MIN: CPT | Mod: 95

## 2022-10-28 NOTE — HISTORY OF PRESENT ILLNESS
[FreeTextEntry1] : This is a 67 year old female  being seen obesity followup visit. \par \par Patient states weight is the same \par Patient unable to tolerate topiramate, waiting for ozempic \par \par She has been having gi issues, more constipated, saw GI and he put her on Linzess \par Eating more fiber, reports bloat \par Not keeping a food journal \par Not eating breakfast- states she can not eat until 12-1pm\par L: salad and beans \par 4-5pm: almonds, fruit \par D: fish and veg \par 8-9pm almonds and fruit- sometimes hungry sometimes behavior  \par \par Walks 30 min 3-4 days a week \par On average 6k steps dailly \par \par

## 2022-10-28 NOTE — ASSESSMENT
[FreeTextEntry1] : BARIATRIC SURGERY HISTORY: \par \par OBESITY COMORBIDITIES: HLD, pre-dm\par \par ANTI-OBESITY MEDICATIONS: none\par \par OBESITY MEDICATION SIDE EFFECTS: headaches with topiramate \par \par \par Plan\par \par whole foods based eating strategy limiting refined carbs and added fats - recommend avoiding sat fat and WFPB + fish 2x/week\par keep food journal\par no snacking after dinner\par increase water intake, goal 60 ounces a day \par track daily activity- 10K steps/day \par \par f/u 4 weeks \par

## 2022-10-28 NOTE — REASON FOR VISIT
[Home] : at home, [unfilled] , at the time of the visit. [Medical Office: ([facility])___] : at the medical office located in  [Follow-Up] : a follow-up visit

## 2022-12-05 ENCOUNTER — APPOINTMENT (OUTPATIENT)
Dept: BARIATRICS/WEIGHT MGMT | Facility: CLINIC | Age: 67
End: 2022-12-05

## 2023-04-25 ENCOUNTER — NON-APPOINTMENT (OUTPATIENT)
Age: 68
End: 2023-04-25

## 2023-05-03 ENCOUNTER — APPOINTMENT (OUTPATIENT)
Dept: INTERNAL MEDICINE | Facility: CLINIC | Age: 68
End: 2023-05-03
Payer: MEDICARE

## 2023-05-03 DIAGNOSIS — Z86.39 PERSONAL HISTORY OF OTHER ENDOCRINE, NUTRITIONAL AND METABOLIC DISEASE: ICD-10-CM

## 2023-05-03 PROCEDURE — 99213 OFFICE O/P EST LOW 20 MIN: CPT | Mod: 25

## 2023-05-03 PROCEDURE — 36415 COLL VENOUS BLD VENIPUNCTURE: CPT

## 2023-05-03 RX ORDER — HYDROCHLOROTHIAZIDE 12.5 MG/1
12.5 TABLET ORAL
Qty: 90 | Refills: 2 | Status: ACTIVE | COMMUNITY
Start: 2017-08-16 | End: 1900-01-01

## 2023-05-03 RX ORDER — ROSUVASTATIN CALCIUM 10 MG/1
10 TABLET, FILM COATED ORAL
Qty: 90 | Refills: 3 | Status: ACTIVE | COMMUNITY
Start: 2023-05-03

## 2023-05-03 NOTE — HISTORY OF PRESENT ILLNESS
[de-identified] : The patient is seen for follow-up vascular.  The patient was seen by cardiology and had a calcium score of 0 but her cholesterol was supposed to be high and she was started on a statin.  She has no side effects

## 2023-05-03 NOTE — PLAN
[FreeTextEntry1] : The patient is on a statin and we are checking her CPK liver functions and lipids.  She is doing well at this time without

## 2023-05-04 LAB
ALBUMIN SERPL ELPH-MCNC: 4.5 G/DL
ALP BLD-CCNC: 66 U/L
ALT SERPL-CCNC: 41 U/L
ANION GAP SERPL CALC-SCNC: 11 MMOL/L
AST SERPL-CCNC: 32 U/L
BASOPHILS # BLD AUTO: 0.06 K/UL
BASOPHILS NFR BLD AUTO: 0.7 %
BILIRUB SERPL-MCNC: 0.4 MG/DL
BUN SERPL-MCNC: 13 MG/DL
CALCIUM SERPL-MCNC: 10.1 MG/DL
CHLORIDE SERPL-SCNC: 101 MMOL/L
CHOLEST SERPL-MCNC: 183 MG/DL
CK SERPL-CCNC: 49 U/L
CO2 SERPL-SCNC: 26 MMOL/L
CREAT SERPL-MCNC: 0.63 MG/DL
CRP SERPL HS-MCNC: 0.88 MG/L
EGFR: 98 ML/MIN/1.73M2
EOSINOPHIL # BLD AUTO: 0.14 K/UL
EOSINOPHIL NFR BLD AUTO: 1.5 %
GLUCOSE SERPL-MCNC: 92 MG/DL
HCT VFR BLD CALC: 43.8 %
HDLC SERPL-MCNC: 94 MG/DL
HGB BLD-MCNC: 14.1 G/DL
IMM GRANULOCYTES NFR BLD AUTO: 0.8 %
LDLC SERPL CALC-MCNC: 72 MG/DL
LYMPHOCYTES # BLD AUTO: 2.71 K/UL
LYMPHOCYTES NFR BLD AUTO: 30 %
MAN DIFF?: NORMAL
MCHC RBC-ENTMCNC: 29.2 PG
MCHC RBC-ENTMCNC: 32.2 GM/DL
MCV RBC AUTO: 90.7 FL
MONOCYTES # BLD AUTO: 0.58 K/UL
MONOCYTES NFR BLD AUTO: 6.4 %
NEUTROPHILS # BLD AUTO: 5.48 K/UL
NEUTROPHILS NFR BLD AUTO: 60.6 %
NONHDLC SERPL-MCNC: 90 MG/DL
PLATELET # BLD AUTO: 361 K/UL
POTASSIUM SERPL-SCNC: 4.1 MMOL/L
PROT SERPL-MCNC: 7.3 G/DL
RBC # BLD: 4.83 M/UL
RBC # FLD: 13.2 %
SODIUM SERPL-SCNC: 139 MMOL/L
TRIGL SERPL-MCNC: 91 MG/DL
WBC # FLD AUTO: 9.04 K/UL

## 2023-05-12 RX ORDER — ORAL SEMAGLUTIDE 7 MG/1
7 TABLET ORAL
Qty: 90 | Refills: 1 | Status: ACTIVE | OUTPATIENT
Start: 2023-05-11

## 2023-06-02 ENCOUNTER — APPOINTMENT (OUTPATIENT)
Dept: BARIATRICS/WEIGHT MGMT | Facility: CLINIC | Age: 68
End: 2023-06-02
Payer: MEDICARE

## 2023-06-02 PROCEDURE — 99442: CPT | Mod: 95

## 2023-06-09 NOTE — HISTORY OF PRESENT ILLNESS
[FreeTextEntry1] : 67 years old woman with HLD, pre-dm and anxiety returns for f/u\par \par has done well with semaglutide now up to 0.5mg\par weight down to 108 lbs from 135\par eating better\par still active\par \par delighted with progress and wants to engage in maintenance \par \par

## 2023-06-09 NOTE — REASON FOR VISIT
[Home] : at home, [unfilled] , at the time of the visit. [Other Location: e.g. Home (Enter Location, City,State)___] : at [unfilled] [Verbal consent obtained from patient] : the patient, [unfilled] [Follow-Up] : a follow-up visit [FreeTextEntry1] : overweight/pre-dm

## 2023-06-09 NOTE — ASSESSMENT
[FreeTextEntry1] : BARIATRIC SURGERY HISTORY: \par \par OBESITY COMORBIDITIES: HLD, pre-dm\par \par ANTI-OBESITY MEDICATIONS:\par \par OBESITY MEDICATION SIDE EFFECTS:\par \par 68 yo woman with HLD, pre-dm, anxiety d/o on cymbalta and recent weight gain that she has not been able to control\par \par Recommend the following:\par \par cont whole foods based eating strategy limiting refined carbs and added fats - recommend avoiding sat fat and WFPB + fish 2x/week\par etoh at most once per week\par track daily activity- 10K steps/day and restart pilates\par start transition to rybelsus 7 mg\par \par rtc in 4 weeks.\par

## 2023-10-10 ENCOUNTER — APPOINTMENT (OUTPATIENT)
Dept: BARIATRICS/WEIGHT MGMT | Facility: CLINIC | Age: 68
End: 2023-10-10
Payer: MEDICARE

## 2023-10-10 DIAGNOSIS — E78.5 HYPERLIPIDEMIA, UNSPECIFIED: ICD-10-CM

## 2023-10-10 DIAGNOSIS — R73.03 PREDIABETES.: ICD-10-CM

## 2023-10-10 PROCEDURE — 99213 OFFICE O/P EST LOW 20 MIN: CPT | Mod: 95

## 2023-10-10 RX ORDER — ORAL SEMAGLUTIDE 14 MG/1
14 TABLET ORAL DAILY
Qty: 90 | Refills: 1 | Status: ACTIVE | OUTPATIENT
Start: 2023-10-10

## 2023-12-20 ENCOUNTER — APPOINTMENT (OUTPATIENT)
Dept: BARIATRICS/WEIGHT MGMT | Facility: CLINIC | Age: 68
End: 2023-12-20
Payer: MEDICARE

## 2023-12-20 ENCOUNTER — OUTPATIENT (OUTPATIENT)
Dept: OUTPATIENT SERVICES | Facility: HOSPITAL | Age: 68
LOS: 1 days | End: 2023-12-20
Payer: MEDICARE

## 2023-12-20 DIAGNOSIS — Z98.89 OTHER SPECIFIED POSTPROCEDURAL STATES: Chronic | ICD-10-CM

## 2023-12-20 DIAGNOSIS — E66.9 OBESITY, UNSPECIFIED: ICD-10-CM

## 2023-12-20 DIAGNOSIS — Z98.890 OTHER SPECIFIED POSTPROCEDURAL STATES: Chronic | ICD-10-CM

## 2023-12-20 PROCEDURE — 99213 OFFICE O/P EST LOW 20 MIN: CPT | Mod: 95

## 2023-12-20 PROCEDURE — G0463: CPT

## 2023-12-20 RX ORDER — DULAGLUTIDE 0.75 MG/.5ML
0.75 INJECTION, SOLUTION SUBCUTANEOUS
Qty: 1 | Refills: 5 | Status: ACTIVE | OUTPATIENT
Start: 2023-12-20

## 2023-12-20 NOTE — REASON FOR VISIT
[Other Location: e.g. Home (Enter Location, City,State)___] : at [unfilled] [Other:____] : [unfilled] [Patient] : the patient [Follow-Up] : a follow-up visit [FreeTextEntry1] : Obesity

## 2023-12-20 NOTE — ASSESSMENT
[FreeTextEntry1] : will give trial of trulicity 0.75mg - might need to increase to 1.5mg after 1 month try to avoid snacking cont pilates add walking daily

## 2023-12-20 NOTE — HISTORY OF PRESENT ILLNESS
[FreeTextEntry1] : 68 years old woman with HLD, pre-dm and anxiety returns for f/u   max weight 135 lbs current weight 1110 lbs (up 7 lbs in past 9 months did not feel rybelsus 7mg was doing much for her.  increased to 14mg and vomited and stopped taking per my instructions would like to try a different injectable (saxenda or trulicity) feels eating is relatively healthy but snacking a few times per day and wasn't on medication pilates twice per week  Otherwise without new complaints today.

## 2023-12-21 DIAGNOSIS — I10 ESSENTIAL (PRIMARY) HYPERTENSION: ICD-10-CM

## 2024-01-13 DIAGNOSIS — R11.0 NAUSEA: ICD-10-CM

## 2024-01-13 RX ORDER — ONDANSETRON 4 MG/1
4 TABLET ORAL
Qty: 30 | Refills: 1 | Status: ACTIVE | COMMUNITY
Start: 2024-01-13 | End: 1900-01-01

## 2024-02-02 RX ORDER — SEMAGLUTIDE 1.34 MG/ML
4 INJECTION, SOLUTION SUBCUTANEOUS
Qty: 1 | Refills: 5 | Status: ACTIVE | COMMUNITY
Start: 2022-10-25 | End: 1900-01-01

## 2024-09-20 ENCOUNTER — NON-APPOINTMENT (OUTPATIENT)
Age: 69
End: 2024-09-20

## 2024-10-29 ENCOUNTER — APPOINTMENT (OUTPATIENT)
Dept: PULMONOLOGY | Facility: CLINIC | Age: 69
End: 2024-10-29
Payer: MEDICARE

## 2024-10-29 VITALS — SYSTOLIC BLOOD PRESSURE: 96 MMHG | DIASTOLIC BLOOD PRESSURE: 60 MMHG

## 2024-10-29 VITALS — OXYGEN SATURATION: 98 % | HEIGHT: 61 IN | BODY MASS INDEX: 19.26 KG/M2 | WEIGHT: 102 LBS | HEART RATE: 86 BPM

## 2024-10-29 DIAGNOSIS — R05.3 CHRONIC COUGH: ICD-10-CM

## 2024-10-29 PROCEDURE — 94726 PLETHYSMOGRAPHY LUNG VOLUMES: CPT

## 2024-10-29 PROCEDURE — 94729 DIFFUSING CAPACITY: CPT

## 2024-10-29 PROCEDURE — 94060 EVALUATION OF WHEEZING: CPT

## 2024-10-29 PROCEDURE — 99203 OFFICE O/P NEW LOW 30 MIN: CPT | Mod: 25

## 2024-10-29 PROCEDURE — ZZZZZ: CPT

## 2024-10-29 RX ORDER — AZELASTINE HYDROCHLORIDE 137 UG/1
137 SPRAY, METERED NASAL TWICE DAILY
Qty: 1 | Refills: 5 | Status: ACTIVE | COMMUNITY
Start: 2024-10-29 | End: 1900-01-01

## 2024-10-29 RX ORDER — ALBUTEROL SULFATE 90 UG/1
108 (90 BASE) INHALANT RESPIRATORY (INHALATION)
Qty: 1 | Refills: 2 | Status: ACTIVE | COMMUNITY
Start: 2024-10-29 | End: 1900-01-01